# Patient Record
Sex: MALE | Race: WHITE
[De-identification: names, ages, dates, MRNs, and addresses within clinical notes are randomized per-mention and may not be internally consistent; named-entity substitution may affect disease eponyms.]

---

## 2020-06-29 ENCOUNTER — HOSPITAL ENCOUNTER (EMERGENCY)
Age: 60
Discharge: HOME | End: 2020-06-29
Payer: COMMERCIAL

## 2020-06-29 VITALS
RESPIRATION RATE: 16 BRPM | SYSTOLIC BLOOD PRESSURE: 135 MMHG | OXYGEN SATURATION: 98 % | HEART RATE: 71 BPM | DIASTOLIC BLOOD PRESSURE: 79 MMHG

## 2020-06-29 VITALS
OXYGEN SATURATION: 97 % | RESPIRATION RATE: 20 BRPM | SYSTOLIC BLOOD PRESSURE: 130 MMHG | HEART RATE: 67 BPM | DIASTOLIC BLOOD PRESSURE: 88 MMHG

## 2020-06-29 VITALS
OXYGEN SATURATION: 95 % | TEMPERATURE: 97.2 F | SYSTOLIC BLOOD PRESSURE: 110 MMHG | RESPIRATION RATE: 20 BRPM | DIASTOLIC BLOOD PRESSURE: 70 MMHG | HEART RATE: 79 BPM

## 2020-06-29 VITALS
HEART RATE: 72 BPM | OXYGEN SATURATION: 94 % | RESPIRATION RATE: 19 BRPM | DIASTOLIC BLOOD PRESSURE: 74 MMHG | SYSTOLIC BLOOD PRESSURE: 113 MMHG

## 2020-06-29 VITALS — SYSTOLIC BLOOD PRESSURE: 130 MMHG | DIASTOLIC BLOOD PRESSURE: 89 MMHG | HEART RATE: 71 BPM

## 2020-06-29 VITALS
HEART RATE: 73 BPM | SYSTOLIC BLOOD PRESSURE: 112 MMHG | RESPIRATION RATE: 17 BRPM | DIASTOLIC BLOOD PRESSURE: 67 MMHG | OXYGEN SATURATION: 95 %

## 2020-06-29 VITALS — BODY MASS INDEX: 29.7 KG/M2

## 2020-06-29 DIAGNOSIS — R11.2: ICD-10-CM

## 2020-06-29 DIAGNOSIS — R55: Primary | ICD-10-CM

## 2020-06-29 DIAGNOSIS — E11.65: ICD-10-CM

## 2020-06-29 DIAGNOSIS — R31.9: ICD-10-CM

## 2020-06-29 DIAGNOSIS — Z79.899: ICD-10-CM

## 2020-06-29 DIAGNOSIS — E87.1: ICD-10-CM

## 2020-06-29 DIAGNOSIS — Z79.4: ICD-10-CM

## 2020-06-29 DIAGNOSIS — Z72.0: ICD-10-CM

## 2020-06-29 LAB
ALANINE AMINOTRANSFER ALT/SGPT: 47 U/L (ref 16–61)
ALBUMIN SERPL-MCNC: 2.5 G/DL (ref 3.2–5)
ALKALINE PHOSPHATASE: 147 U/L (ref 45–117)
ANION GAP: 7 (ref 5–15)
APTT PPP: 37.8 SECONDS (ref 24.1–36.2)
AST(SGOT): 54 U/L (ref 15–37)
BUN SERPL-MCNC: 16 MG/DL (ref 7–18)
BUN/CREAT RATIO: 10.2 RATIO (ref 10–20)
CALCIUM SERPL-MCNC: 8.7 MG/DL (ref 8.5–10.1)
CARBON DIOXIDE: 27 MMOL/L (ref 21–32)
CHLORIDE: 96 MMOL/L (ref 98–107)
DEPRECATED RDW RBC: 45.6 FL (ref 35.1–43.9)
DIFFERENTIAL INDICATED: (no result)
ERYTHROCYTE [DISTWIDTH] IN BLOOD: 12.7 % (ref 11.6–14.6)
EST GLOM FILT RATE - AFR AMER: 58 ML/MIN (ref 60–?)
ESTIMATED CREATININE CLEARANCE: 46.78 ML/MIN
GLOBULIN: 5.4 G/DL (ref 2.2–4.2)
GLUCOSE, DIPSTICK: 1000 MG/DL
GLUCOSE: 501 MG/DL (ref 74–106)
HCT VFR BLD AUTO: 35.9 % (ref 40–54)
HEMOGLOBIN: 11.9 G/DL (ref 13–16.5)
HGB BLD-MCNC: 11.9 G/DL (ref 13–16.5)
IMMATURE GRANULOCYTES COUNT: 0.03 X10^3/UL (ref 0–0)
MCV RBC: 99.2 FL (ref 80–94)
MEAN CORP HGB CONC: 33.1 G/DL (ref 32–36)
MEAN PLATELET VOL.: 11.3 FL (ref 6.2–12)
MUCOUS THREADS URNS QL MICRO: (no result) /HPF
NRBC FLAGGED BY ANALYZER: 0 % (ref 0–5)
PLATELET # BLD: 62 K/MM3 (ref 150–450)
PLATELET COUNT: 62 K/MM3 (ref 150–450)
POSITIVE COUNT: YES
POTASSIUM: 4.3 MMOL/L (ref 3.5–5.1)
PROTHROMBIN TIME (PROTIME)PT.: 15.5 SECONDS (ref 11.7–14.9)
RBC # BLD AUTO: 3.62 M/MM3 (ref 4.6–6.2)
RBC DISTRIBUTION WIDTH CV: 12.7 % (ref 11.6–14.6)
RBC DISTRIBUTION WIDTH SD: 45.6 FL (ref 35.1–43.9)
RBC UR QL: (no result) /HPF (ref 0–5)
RBC UR QL: 10 /UL
SCAN SMEAR PER REVIEW CRITERIA: (no result)
SP GR UR: 1 (ref 1–1.03)
SQUAMOUS URNS QL MICRO: (no result) /HPF (ref 0–5)
URINE PRESERVATIVE: (no result)
WBC # BLD AUTO: 4.3 K/MM3 (ref 4.4–11)
WHITE BLOOD COUNT: 4.3 K/MM3 (ref 4.4–11)

## 2020-06-29 PROCEDURE — 96361 HYDRATE IV INFUSION ADD-ON: CPT

## 2020-06-29 PROCEDURE — 99285 EMERGENCY DEPT VISIT HI MDM: CPT

## 2020-06-29 PROCEDURE — 71045 X-RAY EXAM CHEST 1 VIEW: CPT

## 2020-06-29 PROCEDURE — 85730 THROMBOPLASTIN TIME PARTIAL: CPT

## 2020-06-29 PROCEDURE — 81001 URINALYSIS AUTO W/SCOPE: CPT

## 2020-06-29 PROCEDURE — 85610 PROTHROMBIN TIME: CPT

## 2020-06-29 PROCEDURE — 82962 GLUCOSE BLOOD TEST: CPT

## 2020-06-29 PROCEDURE — 84484 ASSAY OF TROPONIN QUANT: CPT

## 2020-06-29 PROCEDURE — A4216 STERILE WATER/SALINE, 10 ML: HCPCS

## 2020-06-29 PROCEDURE — 85025 COMPLETE CBC W/AUTO DIFF WBC: CPT

## 2020-06-29 PROCEDURE — 96360 HYDRATION IV INFUSION INIT: CPT

## 2020-06-29 PROCEDURE — 93005 ELECTROCARDIOGRAM TRACING: CPT

## 2020-06-29 PROCEDURE — 82009 KETONE BODYS QUAL: CPT

## 2020-06-29 PROCEDURE — 80053 COMPREHEN METABOLIC PANEL: CPT

## 2020-10-05 ENCOUNTER — HOSPITAL ENCOUNTER (OUTPATIENT)
Dept: HOSPITAL 100 - MTLAB | Age: 60
End: 2020-10-05
Payer: COMMERCIAL

## 2020-10-05 VITALS — BODY MASS INDEX: 29.7 KG/M2

## 2020-10-05 DIAGNOSIS — K76.9: Primary | ICD-10-CM

## 2020-10-05 LAB
ALANINE AMINOTRANSFER ALT/SGPT: 48 U/L (ref 16–61)
ALBUMIN SERPL-MCNC: 2.6 G/DL (ref 3.2–5)
ALKALINE PHOSPHATASE: 173 U/L (ref 45–117)
ANION GAP: 8 (ref 5–15)
APTT PPP: 42.8 SECONDS (ref 24.1–36.2)
AST(SGOT): 71 U/L (ref 15–37)
BUN SERPL-MCNC: 14 MG/DL (ref 7–18)
BUN/CREAT RATIO: 13.1 RATIO (ref 10–20)
CALCIUM SERPL-MCNC: 8.4 MG/DL (ref 8.5–10.1)
CARBON DIOXIDE: 23 MMOL/L (ref 21–32)
CHLORIDE: 104 MMOL/L (ref 98–107)
EST GLOM FILT RATE - AFR AMER: 91 ML/MIN (ref 60–?)
GLOBULIN: 5.7 G/DL (ref 2.2–4.2)
GLUCOSE: 203 MG/DL (ref 74–106)
POTASSIUM: 3.3 MMOL/L (ref 3.5–5.1)
PROTHROMBIN TIME (PROTIME)PT.: 15.9 SECONDS (ref 11.7–14.9)

## 2020-10-05 PROCEDURE — 87340 HEPATITIS B SURFACE AG IA: CPT

## 2020-10-05 PROCEDURE — 82105 ALPHA-FETOPROTEIN SERUM: CPT

## 2020-10-05 PROCEDURE — 86803 HEPATITIS C AB TEST: CPT

## 2020-10-05 PROCEDURE — 80053 COMPREHEN METABOLIC PANEL: CPT

## 2020-10-05 PROCEDURE — 85610 PROTHROMBIN TIME: CPT

## 2020-10-05 PROCEDURE — 36415 COLL VENOUS BLD VENIPUNCTURE: CPT

## 2020-10-05 PROCEDURE — 85730 THROMBOPLASTIN TIME PARTIAL: CPT

## 2023-11-30 ENCOUNTER — HOSPITAL ENCOUNTER (OUTPATIENT)
Age: 63
Discharge: HOME | End: 2023-11-30
Payer: COMMERCIAL

## 2023-11-30 VITALS
DIASTOLIC BLOOD PRESSURE: 70 MMHG | TEMPERATURE: 98.6 F | OXYGEN SATURATION: 94 % | SYSTOLIC BLOOD PRESSURE: 114 MMHG | HEART RATE: 72 BPM | RESPIRATION RATE: 18 BRPM

## 2023-11-30 DIAGNOSIS — R18.8: ICD-10-CM

## 2023-11-30 DIAGNOSIS — K74.60: Primary | ICD-10-CM

## 2023-11-30 PROCEDURE — 49083 ABD PARACENTESIS W/IMAGING: CPT

## 2024-03-14 DIAGNOSIS — K70.31 ALCOHOLIC CIRRHOSIS OF LIVER WITH ASCITES (MULTI): ICD-10-CM

## 2024-03-15 ENCOUNTER — TELEPHONE (OUTPATIENT)
Dept: TRANSPLANT | Facility: HOSPITAL | Age: 64
End: 2024-03-15

## 2024-03-18 ENCOUNTER — TELEPHONE (OUTPATIENT)
Dept: TRANSPLANT | Facility: HOSPITAL | Age: 64
End: 2024-03-18
Payer: COMMERCIAL

## 2024-03-20 ENCOUNTER — TELEPHONE (OUTPATIENT)
Dept: TRANSPLANT | Facility: HOSPITAL | Age: 64
End: 2024-03-20
Payer: COMMERCIAL

## 2024-03-21 ENCOUNTER — DOCUMENTATION (OUTPATIENT)
Dept: TRANSPLANT | Facility: HOSPITAL | Age: 64
End: 2024-03-21
Payer: COMMERCIAL

## 2024-03-21 ENCOUNTER — TELEPHONE (OUTPATIENT)
Dept: TRANSPLANT | Facility: HOSPITAL | Age: 64
End: 2024-03-21
Payer: COMMERCIAL

## 2024-03-21 NOTE — TELEPHONE ENCOUNTER
How did you hear about [facility]?   MD  Referral Source comments   NONE  Have you ever contacted the [facility] before?   NO  Are you currently listed or being evaluated by any other transplant center?   NO  Where are you currently listed or being evaluated for transplant?   N/A  Are you also seeking other organ transplant(s)?   NO  Are you a ?   NO  Do you have your own transportation?   NO  Do you have a 's license?   YES  Do you have a working vehicle?   NO  Do you have someone in your support system that can drive you to and from all your appointments as needed?   YES  Are you prepared to drive to [place], [place] for all necessary appointments? If not, you may need to speak with a  prior to scheduling your appointments. We are unable to assist with transportation or parking.   YES  Transportation Comments   NONE    GENERAL HEALTH STATUS LI  What is the primary cause of your organ disease?   ALCOHOL  Have you had a heart attack?   NO  When was your heart attack?   N/A  Where was your heart attack treated?   N/A  Have you had a stroke?   NO  When was your stroke?   N/A  Where was your stroke treated?   N/A     GENERAL HEALTH STATUS HT  Have you had any pregnancies?   N/A  If yes, how many pregnancies?   N/A  Have you had any transfusions?   YES  If yes, how many transfusions?   1  Have you ever been on dialysis?   NO    DIABETES HISTORY LI  What type of diabetes do you have?   TYPE II  Are you taking insulin for your diabetes?   YES    CANCER HISTORY  Where is cancer located?   NO  When was the diagnosis made?   N/A  Who provided cancer treatment?   N/A     MENTAL HEALTH HISTORY LI  Are you currently or have you previously been seen by a mental health professional?   NO  Who is the treating doctor for your mental health issues?   N/A  Did you receive a specific diagnosis?   N/A    SUBSTANCE USE HISTORY LI  Are you a current or former  tobacco user?   CURRENT  Describe your tobacco use.   SMOKES A PACK EVERY TWO WEEKS  When did you quit using tobacco products?   N/A  Are you a current or former alcohol user?   FORMER  Describe your alcohol use.   EVERY DAY, THEN HE STARTED CUTTING BACK  When did you quit drinking alcohol?   03/19/2024  Have you ever received treatment for alcohol abuse?   NO  When and where did you receive treatment for alcohol abuse?   N/A  Are you a current or former marijuana user?   NO  Describe your marijuana use.   N/A  When did you quit using marijuana?   N/A  Have you ever received treatment for marijuana abuse?   N/A  When and where did you receive treatment for marijuana abuse?   N/A  Are you a current or former user of illegal drugs such as heroin, cocaine, meth, crack, or any other recreational substance?   NO  Describe your illegal drug use.   N/A  When did you quit using illegal drugs?   N/A  Have you ever received treatment for illegal drug abuse?   N/A  When and where did you receive treatment for illegal drug abuse?   N/A    DIAGNOSTIC TESTING LI  Have you had a colonoscopy in the last 5 years?   YES  Where was the colonoscopy performed?   [facility], IN [place] [facility]  Have you had a EGD (Endoscopy)?   YES  Where was the EGD(Endoscopy) Performed?   [facility] IN [place]    COMMENTS  NONE

## 2024-03-28 ENCOUNTER — DOCUMENTATION (OUTPATIENT)
Dept: TRANSPLANT | Facility: HOSPITAL | Age: 64
End: 2024-03-28
Payer: COMMERCIAL

## 2024-03-28 NOTE — Clinical Note
Appointments are not to be made for patient.  Per Amalia of [facility]  Commercial plan a/e 01/01/24 700.00 ded/3000.00 oop max (met).  She stated patient has a 10K transplant max per transplant.  Left v/m for patient to call me.

## 2024-03-28 NOTE — PROGRESS NOTES
Appointments are not to be made for patient.  Per Amalia of [facility]  Commercial plan a/e 01/01/24 700.00 ded/3000.00 oop max (met).  She stated patient has a 10K transplant max per transplant.  Left v/m for patient to call me.

## 2024-04-03 ENCOUNTER — DOCUMENTATION (OUTPATIENT)
Dept: TRANSPLANT | Facility: HOSPITAL | Age: 64
End: 2024-04-03
Payer: COMMERCIAL

## 2024-04-03 NOTE — PROGRESS NOTES
Called patient for a second time to see if he has any form of insurance.  The [employer] insurance provided is not active.  Left v/m with my name and number.

## 2024-04-10 ENCOUNTER — DOCUMENTATION (OUTPATIENT)
Dept: TRANSPLANT | Facility: HOSPITAL | Age: 64
End: 2024-04-10
Payer: COMMERCIAL

## 2024-04-10 NOTE — PROGRESS NOTES
Spoke to patients spouse today re the [employer] transplant benefits.  Patient just tirned 64 & collects 1800.00 in SS and she collects 900.00 in SS.  Both are retired and she stated the [employer] policy is an JELANI plan.  Discussed Medicaid and she will call to see if they qualify.  Emailed the nurse to see if patient could be seen in general hep clinic.

## 2024-04-10 NOTE — PROGRESS NOTES
Called Allie & re-verified with Amalia patient has a 10K transplant max per transplant.  Rec'd v/m from patients spouse.  Called her back & left a v/m with my name and number.

## 2024-04-11 ENCOUNTER — DOCUMENTATION (OUTPATIENT)
Dept: TRANSPLANT | Facility: HOSPITAL | Age: 64
End: 2024-04-11
Payer: COMMERCIAL

## 2024-04-11 NOTE — PROGRESS NOTES
Spoke to pts spouse & she found out they are 100.00 over the Medicaid limit.  Suggested she call the exchange & see if they could switch since the service is not covered by this plan.

## 2024-05-25 ENCOUNTER — INPATIENT HOSPITAL (OUTPATIENT)
Dept: URBAN - METROPOLITAN AREA HOSPITAL 118 | Facility: HOSPITAL | Age: 64
End: 2024-05-25
Payer: COMMERCIAL

## 2024-05-25 DIAGNOSIS — F10.10 ALCOHOL ABUSE, UNCOMPLICATED: ICD-10-CM

## 2024-05-25 DIAGNOSIS — K70.31 ALCOHOLIC CIRRHOSIS OF LIVER WITH ASCITES: ICD-10-CM

## 2024-05-25 PROCEDURE — 99223 1ST HOSP IP/OBS HIGH 75: CPT | Performed by: INTERNAL MEDICINE
